# Patient Record
Sex: MALE | Race: WHITE
[De-identification: names, ages, dates, MRNs, and addresses within clinical notes are randomized per-mention and may not be internally consistent; named-entity substitution may affect disease eponyms.]

---

## 2019-02-08 ENCOUNTER — HOSPITAL ENCOUNTER (OUTPATIENT)
Dept: HOSPITAL 61 - SURG | Age: 54
Discharge: HOME | End: 2019-02-08
Attending: INTERNAL MEDICINE
Payer: COMMERCIAL

## 2019-02-08 VITALS — DIASTOLIC BLOOD PRESSURE: 69 MMHG | SYSTOLIC BLOOD PRESSURE: 113 MMHG

## 2019-02-08 DIAGNOSIS — K64.0: ICD-10-CM

## 2019-02-08 DIAGNOSIS — Z79.899: ICD-10-CM

## 2019-02-08 DIAGNOSIS — Z72.89: ICD-10-CM

## 2019-02-08 DIAGNOSIS — K21.0: ICD-10-CM

## 2019-02-08 DIAGNOSIS — Z86.010: ICD-10-CM

## 2019-02-08 DIAGNOSIS — Z87.891: ICD-10-CM

## 2019-02-08 DIAGNOSIS — K57.30: Primary | ICD-10-CM

## 2019-02-08 PROCEDURE — 88305 TISSUE EXAM BY PATHOLOGIST: CPT

## 2019-02-08 PROCEDURE — 45378 DIAGNOSTIC COLONOSCOPY: CPT

## 2019-02-08 PROCEDURE — 43239 EGD BIOPSY SINGLE/MULTIPLE: CPT

## 2019-02-08 PROCEDURE — 94640 AIRWAY INHALATION TREATMENT: CPT

## 2019-02-11 NOTE — PATHOLOGY
[facility] Accession Number: [id number]

.                                                                01

Material submitted:                                        .

DISTAL ESOPHAGUS BIOPSY

.                                                                01

Clinical history:                                          .

History of colon polyps, GERD

.                                                                02

**********************************************************************

Diagnosis:

Esophageal biopsies, distal esophagus:

- Segments of hyperplastic squamous esophageal mucosa consistent with

reflux esophagitis.

(JPM:; 02/11/2019)

MBR/02/11/2019

**********************************************************************

.                                                                02

Comment:

Sections of the distal esophageal biopsy reveal segments of tangentially

oriented, hyperplastic squamous esophageal mucosa.  Focally, there are a

few intraepithelial eosinophils.  The findings are consistent with reflux

esophagitis.  There is no evidence of Quinn's change, dysplasia, or

malignancy.

(JPM:; 02/11/2019)

.                                                                02

Electronically signed:                                     .

Vazquez Osei MD, Pathologist

NPI- [id number]

.                                                                01

Gross description:                                         .

Received in formalin labeled "Charanjit Torres, distal esophagus biopsy,

rule out Quinn's," are 3 segments of tan soft tissue measuring 1.3 x 0.5

x 0.1 cm in aggregate dimensions and ranging from 0.4 to 0.6 cm in maximum

dimension.  The specimen is submitted entirely in cassette A1.

(TSD; 2/8/2019)

TOB/TOB

.                                                                02

Pathologist provided ICD-10:

K21.0

.                                                                02

CPT                                                        .

299544

Specimen Comment: A courtesy copy of this report has been sent to

Specimen Comment: [phone], [phone].

Specimen Comment: Report sent to  / DR VELASQUEZ

Specimen Comment: A duplicate report has been generated due to demographic updates.

***Performed at:  01

   [address], [place], [place]  [id number]

   MD Chilo Chen MD Phone:  [phone]

***Performed at:  02

   [facility]

   [address], [place]  [id number]

   MD Vazquez Osei MD Phone:  [phone]

## 2021-10-28 ENCOUNTER — HOSPITAL ENCOUNTER (EMERGENCY)
Dept: HOSPITAL 61 - ER | Age: 56
Discharge: HOME | End: 2021-10-28
Payer: COMMERCIAL

## 2021-10-28 VITALS — SYSTOLIC BLOOD PRESSURE: 131 MMHG | DIASTOLIC BLOOD PRESSURE: 71 MMHG

## 2021-10-28 VITALS — HEIGHT: 71 IN | BODY MASS INDEX: 24.54 KG/M2 | WEIGHT: 175.27 LBS

## 2021-10-28 DIAGNOSIS — R11.2: ICD-10-CM

## 2021-10-28 DIAGNOSIS — R10.31: Primary | ICD-10-CM

## 2021-10-28 DIAGNOSIS — R10.32: ICD-10-CM

## 2021-10-28 LAB
ALBUMIN SERPL-MCNC: 4.2 G/DL (ref 3.4–5)
ALBUMIN/GLOB SERPL: 1.2 {RATIO} (ref 1–1.7)
ALP SERPL-CCNC: 76 U/L (ref 46–116)
ALT SERPL-CCNC: 41 U/L (ref 16–63)
ANION GAP SERPL CALC-SCNC: 14 MMOL/L (ref 6–14)
APTT PPP: YELLOW S
AST SERPL-CCNC: 21 U/L (ref 15–37)
BACTERIA #/AREA URNS HPF: 0 /HPF
BASOPHILS # BLD AUTO: 0 X10^3/UL (ref 0–0.2)
BASOPHILS NFR BLD: 0 % (ref 0–3)
BILIRUB SERPL-MCNC: 2.2 MG/DL (ref 0.2–1)
BILIRUB UR QL STRIP: NEGATIVE
BUN SERPL-MCNC: 12 MG/DL (ref 8–26)
BUN/CREAT SERPL: 11 (ref 6–20)
CALCIUM SERPL-MCNC: 9 MG/DL (ref 8.5–10.1)
CHLORIDE SERPL-SCNC: 103 MMOL/L (ref 98–107)
CO2 SERPL-SCNC: 23 MMOL/L (ref 21–32)
CREAT SERPL-MCNC: 1.1 MG/DL (ref 0.7–1.3)
EOSINOPHIL NFR BLD: 0 % (ref 0–3)
EOSINOPHIL NFR BLD: 0 X10^3/UL (ref 0–0.7)
ERYTHROCYTE [DISTWIDTH] IN BLOOD BY AUTOMATED COUNT: 12.8 % (ref 11.5–14.5)
FIBRINOGEN PPP-MCNC: CLEAR MG/DL
GFR SERPLBLD BASED ON 1.73 SQ M-ARVRAT: 69.5 ML/MIN
GLUCOSE SERPL-MCNC: 132 MG/DL (ref 70–99)
HCT VFR BLD CALC: 44.3 % (ref 39–53)
HGB BLD-MCNC: 15.4 G/DL (ref 13–17.5)
LIPASE: 65 U/L (ref 73–393)
LYMPHOCYTES # BLD: 0.7 X10^3/UL (ref 1–4.8)
LYMPHOCYTES NFR BLD AUTO: 6 % (ref 24–48)
MCH RBC QN AUTO: 31 PG (ref 25–35)
MCHC RBC AUTO-ENTMCNC: 35 G/DL (ref 31–37)
MCV RBC AUTO: 88 FL (ref 79–100)
MONO #: 0.4 X10^3/UL (ref 0–1.1)
MONOCYTES NFR BLD: 3 % (ref 0–9)
NEUT #: 9.8 X10^3/UL (ref 1.8–7.7)
NEUTROPHILS NFR BLD AUTO: 90 % (ref 31–73)
NITRITE UR QL STRIP: NEGATIVE
PH UR STRIP: 8 [PH]
PLATELET # BLD AUTO: 296 X10^3/UL (ref 140–400)
POTASSIUM SERPL-SCNC: 3.7 MMOL/L (ref 3.5–5.1)
PROT SERPL-MCNC: 7.6 G/DL (ref 6.4–8.2)
PROT UR STRIP-MCNC: NEGATIVE MG/DL
RBC # BLD AUTO: 5.02 X10^6/UL (ref 4.3–5.7)
RBC #/AREA URNS HPF: 0 /HPF (ref 0–2)
SODIUM SERPL-SCNC: 140 MMOL/L (ref 136–145)
UROBILINOGEN UR-MCNC: 0.2 MG/DL
WBC # BLD AUTO: 10.9 X10^3/UL (ref 4–11)
WBC #/AREA URNS HPF: 0 /HPF (ref 0–4)

## 2021-10-28 PROCEDURE — 93005 ELECTROCARDIOGRAM TRACING: CPT

## 2021-10-28 PROCEDURE — 96374 THER/PROPH/DIAG INJ IV PUSH: CPT

## 2021-10-28 PROCEDURE — 96361 HYDRATE IV INFUSION ADD-ON: CPT

## 2021-10-28 PROCEDURE — 99285 EMERGENCY DEPT VISIT HI MDM: CPT

## 2021-10-28 PROCEDURE — 80053 COMPREHEN METABOLIC PANEL: CPT

## 2021-10-28 PROCEDURE — 81001 URINALYSIS AUTO W/SCOPE: CPT

## 2021-10-28 PROCEDURE — 84484 ASSAY OF TROPONIN QUANT: CPT

## 2021-10-28 PROCEDURE — 83690 ASSAY OF LIPASE: CPT

## 2021-10-28 PROCEDURE — 85025 COMPLETE CBC W/AUTO DIFF WBC: CPT

## 2021-10-28 PROCEDURE — 36415 COLL VENOUS BLD VENIPUNCTURE: CPT

## 2021-10-28 PROCEDURE — 74177 CT ABD & PELVIS W/CONTRAST: CPT

## 2021-10-28 NOTE — RAD
CT of the abdomen and pelvis with contrast  10/28/2021 2:02 PM



Indication:  Reason: BILATERAL LOWER QUARDRANT PAIN  



Comparison study: None



Technique: Multidetector CT imaging of the abdomen and pelvis was performed following the administrat
ion of IV contrast.



Findings: 



The partially visualized lung bases demonstrate no acute abnormality.



The liver, gallbladder, spleen, bilateral adrenal glands, bilateral kidneys, and pancreas, are grossl
y unremarkable. There is no bowel obstruction. No evidence of acute inflammatory change involving vis
ualized bowel is identified. Distal colonic diverticulosis noted. Appendix is visualized and unremark
able in appearance. Bladder is grossly unremarkable. No free fluid or free air is seen in the abdomen
 or pelvis.  No acute osseous changes are identified.



Impression:

1. No evidence of acute intra-abdominal abnormality is identified.

2. Distal colonic diverticulosis without evidence of diverticulitis.

 

 



Electronically signed by: Henry Tesfaye MD (10/28/2021 2:06 PM) [id number]

## 2021-10-28 NOTE — PHYS DOC
General Adult


EDM:


Chief Complaint:  ABDOMINAL PAIN





HPI:


HPI:





Patient is a 56  year old male who presents with lower quadrant abdominal pain 

started 12 hours prior to arrival. Charanjit says that he hasn't had pain like this 

before and rates it 8/10 and describes it as sharp. When asked where the pain 

is, he points to the middle lower abdomen. It does not radiate anywhere. He says

the pain began last night when he felt the need for a BM and has been constant 

since then. He can think of no inciting event or injury prior to this. No 

positions have made him comfortable but he has been able to continue eating and 

drinking. He has tried GasX but said it did not help relieve the pain. He has 

had some episodes of nausea and some emesis this morning prior to arrival. He 

says that he normally is constipated and has had some drops of blood in his 

stool, but he has had hemorrhoids before and said the blood was similar to this.

He has had no issues with urination.





Review of Systems:


Review of Systems:


Fourteen body systems of review of systems have been reviewed. See HPI for 

pertinent positives and negative responses, other wise all other systems are 

negative, non-pertinent or non-contributory





Heart Score:


C/O Chest Pain:  No


HEART Score for Chest Pain:  








HEART Score for Chest Pain Response (Comments) Value


 


History Slighlty/Non-Suspicious 0


 


ECG Normal 0


 


Age >45 - < 65 1


 


Risk Factors                            1 or 2 Risk Factors 1


 


Troponin < Normal Limit 0


 


Total  2








Risk Factors:


Risk Factors:  DM, Current or recent (<one month) smoker, HTN, HLP, family 

history of CAD, obesity.


Risk Scores:


Score 0 - 3:  2.5% MACE over next 6 weeks - Discharge Home


Score 4 - 6:  20.3% MACE over next 6 weeks - Admit for Clinical Observation


Score 7 - 10:  72.7% MACE over next 6 weeks - Early Invasive Strategies





Allergies:


Allergies:





Allergies








Coded Allergies Type Severity Reaction Last Updated Verified


 


  No Known Drug Allergies    2/8/19 No











Physical Exam:


PE:


Constitutional: Well developed, well nourished, dry heaving on arrival but 

overall nontoxic in appearance


HENT: Normocephalic, atraumatic, bilateral external ears normal, oropharynx 

moist, no oral exudates, nose normal. 


Eyes: PERRLA, EOMI, conjunctiva normal, no discharge.  


Neck: Normal range of motion, no tenderness, supple, no stridor.  


Cardiovascular: Heart rate regular, sinus rhythm, no murmurs rubs or gallops


Lungs & Thorax:  Bilateral breath sounds clear to auscultation 


Abdomen: Bowel sounds normal, soft, no tenderness guarding or rebound but 

patient pointing to bilateral lower abdominal quadrants were pain is, no masses,

no pulsatile masses.  Nonsurgical abdomen, no peritoneal signs.  Pubic region 

unremarkable to palpation.  Penis circumcised without drainage and unremarkable.

 Testicles descended bilaterally, nontender with no remarkable findings of 

testes or scrotum


Skin: Warm, dry, no erythema, no rash.  


Back: No tenderness, no CVA tenderness.  


Extremities: No tenderness, no cyanosis, no clubbing, ROM intact, no edema.  


Neurologic: Alert and oriented X 3, grossly normal motor & sensory function, no 

focal deficits noted. 


Psychologic: Anxious affect and mood





Current Patient Data:


Labs:





Laboratory Tests








Test


 10/28/21


15:20


 


Urine Collection Type Unknown 


 


Urine Color Yellow 


 


Urine Clarity Clear 


 


Urine pH 8.0 


 


Urine Specific Gravity >=1.030 


 


Urine Protein Negative mg/dL 


 


Urine Glucose (UA) Negative mg/dL 


 


Urine Ketones (Stick) Trace mg/dL 


 


Urine Blood Negative 


 


Urine Nitrite Negative 


 


Urine Bilirubin Negative 


 


Urine Urobilinogen Dipstick 0.2 mg/dL 


 


Urine Leukocyte Esterase Negative 


 


Urine RBC 0 /HPF 


 


Urine WBC 0 /HPF 


 


Urine Bacteria 0 /HPF 








Current Medications








 Medications


  (Trade)  Dose


 Ordered  Sig/Zahida


 Route


 PRN Reason  Start Time


 Stop Time Status Last Admin


Dose Admin


 


 Sodium Chloride  1,000 ml @ 


 1,000 mls/hr  1X  ONCE


 IV


   10/28/21 13:15


 10/28/21 14:14 DC 10/28/21 13:41





 


 Fentanyl Citrate


  (Fentanyl 2ml


 Vial)  75 mcg  1X  ONCE


 IVP


   10/28/21 13:45


 10/28/21 13:46 DC 10/28/21 13:42





 


 Iohexol


  (Omnipaque 300


 Mg/ml)  75 ml  1X  ONCE


 IV


   10/28/21 13:45


 10/28/21 13:46 DC 10/28/21 13:57





 


 Info


  (CONTRAST GIVEN


 -- Rx MONITORING)  1 each  PRN DAILY  PRN


 [facility]


 SEE COMMENTS  10/28/21 14:00


 10/28/21 16:14 DC  











Vital Signs:





Vital Signs








  Date Time  Temp Pulse Resp B/P (MAP) Pulse Ox O2 Delivery O2 Flow Rate FiO2


 


10/28/21 12:35 98.7 84 22 161/97 (118) 96 Room Air  





 98.7       








Vital Signs








  Date Time  Temp Pulse Resp B/P (MAP) Pulse Ox O2 Delivery O2 Flow Rate FiO2


 


10/28/21 15:37  75  131/71 (91) 95 Room Air  


 


10/28/21 13:42   20     


 


10/28/21 12:35 98.7       





 98.7       











EKG:


EKG:


EKG ordered and interpreted by myself at 1408 hrs. as sinus rhythm at 81 bpm, 

unremarkable intervals, no axis deviation, no STEMI





Radiology/Procedures:


Radiology/Procedures:





CT of the abdomen and pelvis with contrast  10/28/2021 2:02 PM





Indication:  Reason: BILATERAL LOWER QUARDRANT PAIN  





Comparison study: None





Technique: Multidetector CT imaging of the abdomen and pelvis was performed 

following the administration of IV contrast.





Findings: 





The partially visualized lung bases demonstrate no acute abnormality.





The liver, gallbladder, spleen, bilateral adrenal glands, bilateral kidneys, and

pancreas, are grossly unremarkable. There is no bowel obstruction. No evidence 

of acute inflammatory change involving visualized bowel is identified. Distal 

colonic diverticulosis noted. Appendix is visualized and unremarkable in 

appearance. Bladder is grossly unremarkable. No free fluid or free air is seen 

in the abdomen or pelvis.  No acute osseous changes are identified.





Impression:


1. No evidence of acute intra-abdominal abnormality is identified.


2. Distal colonic diverticulosis without evidence of diverticulitis.


 


 





Electronically signed by: Henry Tesfaye MD (10/28/2021 2:06 PM) [id number]





Course & Med Decision Making:


Course & Med Decision Making


ABCs unremarkable. I disclosed entirety of ER findings and discussed most likely

diagnosis of bilateral lower abdominal quadrant pain of unknown etiology. Other 

diagnoses were discussed with patient such as intra-abdominal or  emergencies 

but all deemed less likely causes of patient's presentation.  Patient symptoms 

improved with IV fluids, nausea and pain medication.  I disclose my concern for 

gas pains as likely etiology.  Patient confirms that he has longstanding history

of dietary issues, states he thinks he is lactose intolerant which has led to 

similar episodes in the past.  Given that patient was fully asymptomatic after 

treatment, plan of care discussed at length with need for close outpatient 

follow-up to review today's ER visit stressed. Strict return precautions were 

also discussed at length with good understanding verbalized by patient. Patient 

voiced understanding and agreement with the plan. Patient knows to come back for

repeat evaluation if concerning signs or symptoms present prior to outpatient 

follow-up. Hemodynamically stable, ambulatory and well-appearing at time of 

disposition.





Dragon Disclaimer:


Dragon Disclaimer:


This electronic medical record was generated, in whole or in part, using a voice

recognition dictation system.





Departure


Departure


Impression:  


   Primary Impression:  


   Abdominal pain


Disposition:  01 HOME / SELF CARE / HOMELESS


Condition:  IMPROVED


Referrals:  


JOSE GUADALUPE VELASQUEZ MD (PCP)


Patient Instructions:  Abdominal Pain





Additional Instructions:  


You have been evaluated in the Emergency Department today for abdominal pain. 

Your evaluation was not suggestive of any emergent condition requiring medical 

intervention at this time. 


However, some abdominal problems make take more time to appear. Therefore, it is

important for you to watch for any new symptoms or worsening of your current 

condition. 


As disclosed, please contact your primary care physician and review ER visit 

today.  You would benefit from outpatient dietitian and gastroenterology 

consultations for further work-up


Return to the Emergency Department if you experience worsening pain, persistent 

fevers greater than 100.4, recurrent vomiting, blood in vomit, blood in stool, 

dark tarry stool, chest pain, difficulty breathing, or any other concerning 

symptoms.











KWAME PHIPSP DO                 Oct 28, 2021 12:52

## 2021-10-28 NOTE — EKG
[facility]

              [address] [place] [address]

Test Date:    2021-10-28               Test Time:    13:59:12

Pat Name:     SHABNAM BAUGH            Department:   

Patient ID:   [id number]           Room:          

Gender:       M                        Technician:   

:          [id number]               Requested By: KWAME PHIPPS

Order Number: [id number]           Reading MD:   Stephen Pereira

                                 Measurements

Intervals                              Axis          

Rate:         81                       P:            

CT:                                    QRS:          8

QRSD:         76                       T:            34

QT:           388                                    

QTc:          456                                    

                           Interpretive Statements

SINUS RHYTHM

Electronically Signed On 10- 13:27:57 CDT by Stephen Pereira